# Patient Record
Sex: MALE | Race: WHITE | URBAN - METROPOLITAN AREA
[De-identification: names, ages, dates, MRNs, and addresses within clinical notes are randomized per-mention and may not be internally consistent; named-entity substitution may affect disease eponyms.]

---

## 2022-07-05 ENCOUNTER — TELEPHONE (OUTPATIENT)
Dept: FAMILY MEDICINE CLINIC | Age: 22
End: 2022-07-05

## 2022-07-05 NOTE — TELEPHONE ENCOUNTER
----- Message from [facility] sent at 7/5/2022  8:40 AM EDT -----  Subject: Appointment Request    Reason for Call: New Patient/New to Provider Appointment needed: Urgent   [address]    Reason for appointment request? No appointments available during search     Additional Information for Provider? mom is wanting to get patient   established but is concerned with issues depression and would like seen   asap please contact mom @ phone listed below [phone]  ---------------------------------------------------------------------------  --------------  [id number]  [phone]; OK to leave message on voicemail  ---------------------------------------------------------------------------  --------------  SCRIPT ANSWERS  ANNAID Screen: Abilio Barker